# Patient Record
Sex: FEMALE | Race: BLACK OR AFRICAN AMERICAN | NOT HISPANIC OR LATINO | URBAN - METROPOLITAN AREA
[De-identification: names, ages, dates, MRNs, and addresses within clinical notes are randomized per-mention and may not be internally consistent; named-entity substitution may affect disease eponyms.]

---

## 2020-01-30 ENCOUNTER — EMERGENCY (EMERGENCY)
Facility: HOSPITAL | Age: 30
LOS: 1 days | Discharge: HOME | End: 2020-01-30
Attending: EMERGENCY MEDICINE | Admitting: EMERGENCY MEDICINE
Payer: SELF-PAY

## 2020-01-30 VITALS
OXYGEN SATURATION: 100 % | RESPIRATION RATE: 18 BRPM | SYSTOLIC BLOOD PRESSURE: 149 MMHG | TEMPERATURE: 98 F | HEART RATE: 100 BPM | DIASTOLIC BLOOD PRESSURE: 89 MMHG

## 2020-01-30 PROCEDURE — 99283 EMERGENCY DEPT VISIT LOW MDM: CPT

## 2020-01-30 PROCEDURE — 99053 MED SERV 10PM-8AM 24 HR FAC: CPT

## 2020-01-30 NOTE — ED PROVIDER NOTE - NSFOLLOWUPINSTRUCTIONS_ED_ALL_ED_FT
Rest and plenty of fluids.  Avoid forceful blowing of nose.  Keep area moist with nasal flushes.  Recommend a humidifier for night.  Apply pressure should bleeding re-occur.

## 2020-01-30 NOTE — ED PROVIDER NOTE - CLINICAL SUMMARY MEDICAL DECISION MAKING FREE TEXT BOX
28 y/o F with no significant PMHx presenting with epistaxis, bleeding controlled presently in ED with pressure. Advised pt to stop forcefully blowing nose, use humidifier, and keep area moist.

## 2020-01-30 NOTE — ED PROVIDER NOTE - PATIENT PORTAL LINK FT
You can access the FollowMyHealth Patient Portal offered by [facility] by registering at the following website: [email or web address]. By joining [facility]’s FollowMyHealth portal, you will also be able to view your health information using other applications (apps) compatible with our system.

## 2020-01-30 NOTE — ED ADULT TRIAGE NOTE - CHIEF COMPLAINT QUOTE
Pt st" I am just getting over a cold...was blowing nose a lot. Yesterday morning my nose started bleeding it stopped but then bleeding came back in after noon and then again this morning." Denies med hx, no blood thinners.

## 2020-01-30 NOTE — ED PROVIDER NOTE - PROGRESS NOTE DETAILS
Sada Lubin M.D. Resident  Patient held pressure on nose for 15 minutes. Bleeding stopped. Nasal saline provided to pt. Pt feels comfortable going home.

## 2020-01-30 NOTE — ED ADULT NURSE NOTE - DISCHARGE DATE/TIME
Surgical Pathology Report



Patient Name:  STEVEN MARIA

Accession #:  [id number]

[facility]. Rec. #:  [id number]                                                        

   /Age/Gender:  1965 (Age: 53) / M

Account:  [id number]                                                          

             Location: [facility] SURGICAL

Taken:  2018

Received:  2018

Reported:  2018

Physicians:  Chago Haider MD

  



Specimen(s) Received

 LIPOMA OF LEFT SPERMATIC CORD 





Clinical History

Left inguinal hernia







Final Diagnosis

SPERMATIC CORD, LEFT, LIPOMA, HERNIA REPAIR:

BENIGN FIBROADIPOSE TISSUE COMPATIBLE WITH CORD LIPOMA.





***Electronically Signed***

Nanette Rashid M.D.





Gross Description

Received in formalin labeled "left lipoma of left spermatic cord," is a 5.5 x

2.7 x 1.1 cm portion of yellow, lobulated adipose tissue with attached

fibromembranous tissue. Representative sections are submitted in one cassette.

/2018 30-Jan-2020 08:08

## 2020-01-30 NOTE — ED PROVIDER NOTE - ATTENDING CONTRIBUTION TO CARE
I performed the initial face to face bedside interview with this patient regarding history of present illness, review of symptoms and past medical, social and family history.  I completed an independent physical examination.  I was the initial provider who evaluated this patient.  The history, review of symptoms and examination was documented by the scribe in my presence and I attest to the accuracy of the documentation.  I have signed out the follow up of patient care to the resident.  I have discussed the patient’s plan of care and disposition with the resident.

## 2020-01-30 NOTE — ED PROVIDER NOTE - OBJECTIVE STATEMENT
28 y/o F with no significant PMHx presents to ED for epistaxis since a day. Pt states over past week having URI symptoms and admits to blowing nose forcefully. Notes yesterday had episode of epistaxis which was controlled with pressure. Episode of epistaxis occurred again today at 6am, presently in ED holding pressure. Denies having any fever, chills, SOB, chest pain, abdominal pain, bloodthinner use, or other medical complaints. Pt only taking Mucinex for cough.

## 2020-01-30 NOTE — ED PROVIDER NOTE - THROAT FINDINGS
no exudate/NO STRIDOR/NO TONGUE ELEVATION/NO VESICLES/ULCERS/NO DROOLING/no redness/uvula midline/no vesicles/No blood in posterior pharynx

## 2025-01-28 NOTE — ED PROVIDER NOTE - TEMPLATE, MLM
Kenalog Preparation: Kenalog Total Volume (Ccs): 2.0 How Many Mls Were Removed From The 80 Mg/Ml (5ml) Vial When Preparing The Injectable Solution?: 0 Validate Note Data When Using Inventory: Yes Administered By (Optional): Shagufta Beauchamp Medical Necessity Clause: This procedure was medically necessary because the lesions that were treated were: Kenalog Type Of Vial: Multiple Dose Detail Level: Simple Concentration Of Kenalog Solution Injected (Mg/Ml): 10.0 Consent: The risks of atrophy were reviewed with the patient. Bill For Wasted Drug (Kenalog)?: no EENMT